# Patient Record
(demographics unavailable — no encounter records)

---

## 2024-12-11 NOTE — PHYSICAL EXAM
[Well Developed] : well developed [Well Nourished] : well nourished [No Acute Distress] : no acute distress [Normal Conjunctiva] : normal conjunctiva [No Carotid Bruit] : no carotid bruit [Normal S1, S2] : normal S1, S2 [No Murmur] : no murmur [No Rub] : no rub [No Gallop] : no gallop [Clear Lung Fields] : clear lung fields [Good Air Entry] : good air entry [No Respiratory Distress] : no respiratory distress  [Soft] : abdomen soft [Moves all extremities] : moves all extremities [No Focal Deficits] : no focal deficits [Normal Speech] : normal speech [No edema] : no edema [None] : Left: none [Present] : Left Lower Extremity: present [Absent] : Left Lower Extremity: absent

## 2024-12-12 NOTE — CARDIOLOGY SUMMARY
[de-identified] : EKG 12/11/2024 Normal sinus rhythm 65 bpm  EKG 12/21/22 Normal sinus rhythm 81 bpm EKG 6/12/23 Normal sinus rhythm 84 bpm  EKG 12/11/23 Normal sinus rhythm 77 bpm EKG 6/27/24 NSR 67 bpm  Holter 12/21/22 normal rhythm, low PVC and PAC burden [de-identified] : TTE 1/11/2023 normal global LV sys function, EF 68%, mild MR

## 2024-12-12 NOTE — CARDIOLOGY SUMMARY
[de-identified] : EKG 12/11/2024 Normal sinus rhythm 65 bpm  EKG 12/21/22 Normal sinus rhythm 81 bpm EKG 6/12/23 Normal sinus rhythm 84 bpm  EKG 12/11/23 Normal sinus rhythm 77 bpm EKG 6/27/24 NSR 67 bpm  Holter 12/21/22 normal rhythm, low PVC and PAC burden [de-identified] : TTE 1/11/2023 normal global LV sys function, EF 68%, mild MR

## 2024-12-12 NOTE — ASSESSMENT
[FreeTextEntry1] : Assessment: #Palpitations, dizziness - resolved - MCOT 12/21/22 normal rhythm, low PVC and PAC burden #CAD - asymptomatic  - CCTA 12/19/22 with mild LAD stenosis, calcium score 68, CAD-RADS 2 #Dyslipidemia - controlled - 12/21/22 , , , HDL 71 not lipid lowering therapy - 3/10/23 , TG 48, HDL 87, LDL 70 on atorva 20 - 6/20/24 , TG 51, HDL 91, LDL 78 #Former smoker #Pre-DM #Prevention counseling - Normotensive, BMI 25 #Chronic venous insufficiency C1  12/2024 Hgb 12.7 TSH 1.91 Cr 1.1, K 4.3 , TG 70, HDL 78, LDL 67  Plan: - Continue atorva 20 mg daily - Continue ASA 81 mg daily - Encouraged plant-based and Mediterranean diets, along with increased fruit, nut, vegetable, legume, and lean vegetable or animal protein (preferably fish) consumption - Engage in at least 150 minutes per week of accumulated moderate-intensity aerobic physical activity or 75 minutes per week of vigorous-intensity aerobic physical activity - Labs before next visit - Return to clinic in 6 months.

## 2024-12-12 NOTE — HISTORY OF PRESENT ILLNESS
[FreeTextEntry1] : 68M with GERD, duodenal ulcers, nephrolithiasis, chronic back pain (sees pain management for radio frequency ablations) here for follow-up.   12/11/24 HSS Dr. Hooker  hip replaced 9 weeks ago No chest pain, shortness of breath, palpitations, lightheadedness/dizziness, pre-syncope/syncope, or lower extremity edema.   6/27/24 Pt is seen for f.u.  Denies chest pain, no SOB, no palpitations.  Back pain has improved.  Undergoing lumbar ablations.  Pt is active.  Rides a bike few times per week.  Resumed ASA alternating with turmeric supplements.  Pt is not wearing compression stockings.  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   Retired 5/2024 12/11/23 Gastro okay with enteric coated aspirin. Receiving RFA for chronic back pain and epidurals in neck. Back pain seemed more frequent and lasted longer when he was off of tumeric. Stopped ASA and started tumeric about 1 month ago. Pain tolerance seems better.   No chest pain, shortness of breath, palpitations, lightheadedness/dizziness, pre-syncope/syncope, or lower extremity edema. +cold hands and feet. No claudication, rest pain or wounds.   Considering alf.   6/12/23 No chest pain, shortness of breath, palpitations, lightheadedness/dizziness, pre-syncope/syncope, or lower extremity edema.   Increase stress at work and with his mom.   Mother had MI, living in [place] with sister. s/p PCI  Biking and yoga. Gardening.   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   03/16/23 Pt is seen for f.u. Pt denies SOB, no chest pain, no palpitations, no edema.  Pt couldn't  tolerate taking ASA 81mg daily due to epistaxis.  Pt is compliant with atorvastatin, having side affects since starting atorvastatin such as wt gain and loss of taste, worsening muscle aches.    03/09/23 HgbA1C 5.8 Chol 167 LDL 70 Trig 48   1/26/23 Taking ASA 81 mg intermittently. Tolerating atorvastatin. Discontinued most of his supplements.Continues to feel racing heart.   12/21/22 On 12/19/22 developed back pain, started taking deep breathes. Developed SOB and dizziness. Sat down, onlookers said he was pale. Went to ED. Trop neg x2. EKG 12/19/222 with Normal sinus rhythm no ischemic changes. CCTA with mild LAD stenosis CAD-RADS 2. Discharged from ED on ASA 81 mg daily and atorva 20 and told to f/u with cardiology.   4 episodes of dizziness and palpitations in past 6 days. No syncope. Thinks it is related to increased stress at work. 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

## 2024-12-12 NOTE — REVIEW OF SYSTEMS
[Joint Pain] : joint pain [Telangiectasias] : telangiectasias [Negative] : Genitourinary [SOB] : no shortness of breath [Dyspnea on exertion] : not dyspnea during exertion [Chest Discomfort] : no chest discomfort [Lower Ext Edema] : no extremity edema [Leg Claudication] : no intermittent leg claudication [Palpitations] : no palpitations [Orthopnea] : no orthopnea [PND] : no PND [Syncope] : no syncope [Dizziness] : no dizziness [Under Stress] : not under stress

## 2025-06-10 NOTE — ASSESSMENT
[FreeTextEntry1] : Assessment: #Palpitations, dizziness - resolved - MCOT 12/21/22 normal rhythm, low PVC and PAC burden #CAD - asymptomatic  - CCTA 12/19/22 with mild LAD stenosis, calcium score 68, CAD-RADS 2 #Dyslipidemia - controlled - 12/21/22 , , , HDL 71 not lipid lowering therapy - 3/10/23 , TG 48, HDL 87, LDL 70 on atorva 20 - 6/20/24 , TG 51, HDL 91, LDL 78 -12/2024 , TG 70, HDL 78, LDL 67 on Atorva 20mg  #Former smoker #Pre-DM #Prevention counseling - Normotensive, BMI 25 #Chronic venous insufficiency C1  6/5/2025 A1c 5.8%  Cr 1.4, GFR 55, K 4.8  , TG 55, HDL 93, LDL 80 on atorva 20mg QD TSH 4.34, Free T4 1.2   Plan: - Increase atorva from 20 mg daily to 40 mg daily, labs in 12 weeks - Recommend ASA 81 mg daily, but patient has self-discontinued due to easy bruising/bleeding - Encouraged plant-based and Mediterranean diets, along with increased fruit, nut, vegetable, legume, and lean vegetable or animal protein (preferably fish) consumption - Engage in at least 150 minutes per week of accumulated moderate-intensity aerobic physical activity or 75 minutes per week of vigorous-intensity aerobic physical activity - TTM 12 weeks with DEVYN Phipps - Return to clinic in 6 months - TAQUERIA GÓMEZ, Dr. Jewell, personally performed the evaluation and management (E/M) services for this established patient who presents today with (a) new problem(s)/exacerbation of (an) existing condition(s). That E/M includes conducting the clinically appropriate interval history &/or exam, assessing all new/exacerbated conditions, and establishing a new plan of care. Today, DEVYN Barnett was here to observe &/or participate in the visit & follow plan of care established by me.

## 2025-06-10 NOTE — HISTORY OF PRESENT ILLNESS
[FreeTextEntry1] : 68M with GERD, duodenal ulcers, nephrolithiasis, chronic back pain (sees pain management for radio frequency ablations) here for follow-up.   6/10/25 No chest pain, shortness of breath, palpitations, lightheadedness/dizziness, pre-syncope/syncope, or lower extremity edema.  Patient has ongoing chronic pain issues, having problems with treatment due to insurance changing. Gait off since hip replacement. Not riding bike.  Reports he has not been compliant with dietary and not being as active due to chronic pain.   12/11/24 HSS Dr. Hooker hip replaced 9 weeks ago No chest pain, shortness of breath, palpitations, lightheadedness/dizziness, pre-syncope/syncope, or lower extremity edema.   6/27/24 Pt is seen for f.u.  Denies chest pain, no SOB, no palpitations.  Back pain has improved.  Undergoing lumbar ablations.  Pt is active.  Rides a bike few times per week.  Resumed ASA alternating with turmeric supplements.  Pt is not wearing compression stockings.  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   Retired 5/2024 12/11/23 Gastro okay with enteric coated aspirin. Receiving RFA for chronic back pain and epidurals in neck. Back pain seemed more frequent and lasted longer when he was off of tumeric. Stopped ASA and started tumeric about 1 month ago. Pain tolerance seems better.   No chest pain, shortness of breath, palpitations, lightheadedness/dizziness, pre-syncope/syncope, or lower extremity edema. +cold hands and feet. No claudication, rest pain or wounds.   Considering senior living.   6/12/23 No chest pain, shortness of breath, palpitations, lightheadedness/dizziness, pre-syncope/syncope, or lower extremity edema.   Increase stress at work and with his mom.   Mother had MI, living in [place] with sister. s/p PCI  Biking and yoga. Gardening.   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   03/16/23 Pt is seen for f.u. Pt denies SOB, no chest pain, no palpitations, no edema.  Pt couldn't  tolerate taking ASA 81mg daily due to epistaxis.  Pt is compliant with atorvastatin, having side affects since starting atorvastatin such as wt gain and loss of taste, worsening muscle aches.    03/09/23 HgbA1C 5.8 Chol 167 LDL 70 Trig 48   1/26/23 Taking ASA 81 mg intermittently. Tolerating atorvastatin. Discontinued most of his supplements.Continues to feel racing heart.   12/21/22 On 12/19/22 developed back pain, started taking deep breathes. Developed SOB and dizziness. Sat down, onlookers said he was pale. Went to ED. Trop neg x2. EKG 12/19/222 with Normal sinus rhythm no ischemic changes. CCTA with mild LAD stenosis CAD-RADS 2. Discharged from ED on ASA 81 mg daily and atorva 20 and told to f/u with cardiology.   4 episodes of dizziness and palpitations in past 6 days. No syncope. Thinks it is related to increased stress at work. 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

## 2025-06-10 NOTE — CARDIOLOGY SUMMARY
[de-identified] : TTE 1/11/2023 normal global LV sys function, EF 68%, mild MR [de-identified] : EKG 6/10/2025 Normal sinus rhythm 75bpm EKG 12/11/2024 Normal sinus rhythm 65 bpm  EKG 12/21/22 Normal sinus rhythm 81 bpm EKG 6/12/23 Normal sinus rhythm 84 bpm  EKG 12/11/23 Normal sinus rhythm 77 bpm EKG 6/27/24 NSR 67 bpm  Holter 12/21/22 normal rhythm, low PVC and PAC burden